# Patient Record
(demographics unavailable — no encounter records)

---

## 2020-03-05 NOTE — CT
CT HEAD WITHOUT IV CONTRAST



COMPARISON:

 3/13/2015



HISTORY:

 Intermittent headaches since diffuse day. Sharp and stabbing head pain. Nausea and photophobia.



TECHNIQUE: Axial CT imaging at 5 mm intervals from vertex through skull base without contrast



FINDINGS:

There is no evidence of an acute infarction, hemorrhage, mass effect, or midline shift. The ventricul
ar system is normal in size, shape, and position. 



Mild mucosal thickening is seen in the left sphenoid sinus. The remainder the visualized paranasal si
nuses and mastoid air cells are clear.



Osseous structures appear intact.No other interval change.



IMPRESSION:

1. No acute intracranial abnormality demonstrated.

2. Mild sinus disease involving the left sphenoid sinus.



Reported By: Cas Barr 

Electronically Signed:  3/5/2020 10:12 PM